# Patient Record
Sex: FEMALE | Race: WHITE | NOT HISPANIC OR LATINO | URBAN - METROPOLITAN AREA
[De-identification: names, ages, dates, MRNs, and addresses within clinical notes are randomized per-mention and may not be internally consistent; named-entity substitution may affect disease eponyms.]

---

## 2017-10-09 ENCOUNTER — HISTORICAL (OUTPATIENT)
Dept: ADMINISTRATIVE | Facility: HOSPITAL | Age: 32
End: 2017-10-09

## 2017-10-16 ENCOUNTER — HISTORICAL (OUTPATIENT)
Dept: OCCUPATIONAL THERAPY | Facility: HOSPITAL | Age: 32
End: 2017-10-16

## 2017-10-23 ENCOUNTER — HISTORICAL (OUTPATIENT)
Dept: OCCUPATIONAL THERAPY | Facility: HOSPITAL | Age: 32
End: 2017-10-23

## 2017-10-25 ENCOUNTER — HISTORICAL (OUTPATIENT)
Dept: OCCUPATIONAL THERAPY | Facility: HOSPITAL | Age: 32
End: 2017-10-25

## 2017-10-27 ENCOUNTER — HISTORICAL (OUTPATIENT)
Dept: OCCUPATIONAL THERAPY | Facility: HOSPITAL | Age: 32
End: 2017-10-27

## 2017-10-30 ENCOUNTER — HISTORICAL (OUTPATIENT)
Dept: OCCUPATIONAL THERAPY | Facility: HOSPITAL | Age: 32
End: 2017-10-30

## 2017-11-01 ENCOUNTER — HISTORICAL (OUTPATIENT)
Dept: OCCUPATIONAL THERAPY | Facility: HOSPITAL | Age: 32
End: 2017-11-01

## 2017-11-01 ENCOUNTER — HOSPITAL ENCOUNTER (OUTPATIENT)
Dept: MEDSURG UNIT | Facility: HOSPITAL | Age: 32
End: 2017-11-02
Attending: INTERNAL MEDICINE | Admitting: FAMILY MEDICINE

## 2017-11-01 LAB
ABS NEUT (OLG): 4.38 X10(3)/MCL (ref 2.1–9.2)
APTT PPP: 23.5 SECOND(S) (ref 23.3–37)
BASOPHILS # BLD AUTO: 0.03 X10(3)/MCL
BASOPHILS NFR BLD AUTO: 0 % (ref 0–1)
BUN SERPL-MCNC: 14 MG/DL (ref 7–18)
CALCIUM SERPL-MCNC: 8.8 MG/DL (ref 8.5–10.1)
CHLORIDE SERPL-SCNC: 105 MMOL/L (ref 98–107)
CO2 SERPL-SCNC: 26 MMOL/L (ref 21–32)
CREAT SERPL-MCNC: 0.9 MG/DL (ref 0.6–1.3)
EOSINOPHIL # BLD AUTO: 0.13 X10(3)/MCL
EOSINOPHIL NFR BLD AUTO: 2 % (ref 0–5)
ERYTHROCYTE [DISTWIDTH] IN BLOOD BY AUTOMATED COUNT: 12.8 % (ref 11.5–14.5)
GLUCOSE SERPL-MCNC: 98 MG/DL (ref 74–106)
HCT VFR BLD AUTO: 38.2 % (ref 35–46)
HGB BLD-MCNC: 12.6 GM/DL (ref 12–16)
IMM GRANULOCYTES # BLD AUTO: 0.01 10*3/UL
IMM GRANULOCYTES NFR BLD AUTO: 0 %
INR PPP: 0.9 (ref 0.9–1.2)
LYMPHOCYTES # BLD AUTO: 2.3 X10(3)/MCL
LYMPHOCYTES NFR BLD AUTO: 31 % (ref 15–40)
MCH RBC QN AUTO: 29.3 PG (ref 26–34)
MCHC RBC AUTO-ENTMCNC: 33 GM/DL (ref 31–37)
MCV RBC AUTO: 88.8 FL (ref 80–100)
MONOCYTES # BLD AUTO: 0.56 X10(3)/MCL
MONOCYTES NFR BLD AUTO: 8 % (ref 4–12)
NEUTROPHILS # BLD AUTO: 4.38 X10(3)/MCL
NEUTROPHILS NFR BLD AUTO: 59 X10(3)/MCL
PLATELET # BLD AUTO: 176 X10(3)/MCL (ref 130–400)
PMV BLD AUTO: 10.7 FL (ref 7.4–10.4)
POC BETA-HCG (QUAL): NEGATIVE
POTASSIUM SERPL-SCNC: 3.6 MMOL/L (ref 3.5–5.1)
PROTHROMBIN TIME: 12 SECOND(S) (ref 11.9–14.4)
RBC # BLD AUTO: 4.3 X10(6)/MCL (ref 4–5.2)
SODIUM SERPL-SCNC: 139 MMOL/L (ref 136–145)
WBC # SPEC AUTO: 7.4 X10(3)/MCL (ref 4.5–11)

## 2017-11-02 LAB
ABS NEUT (OLG): 2.35 X10(3)/MCL (ref 2.1–9.2)
ALBUMIN SERPL-MCNC: 3.1 GM/DL (ref 3.4–5)
ALBUMIN/GLOB SERPL: 1 RATIO (ref 1–2)
ALP SERPL-CCNC: 58 UNIT/L (ref 45–117)
ALT SERPL-CCNC: 14 UNIT/L (ref 12–78)
AST SERPL-CCNC: 12 UNIT/L (ref 15–37)
BASOPHILS # BLD AUTO: 0.04 X10(3)/MCL
BASOPHILS NFR BLD AUTO: 1 % (ref 0–1)
BILIRUB SERPL-MCNC: 0.3 MG/DL (ref 0.2–1)
BILIRUBIN DIRECT+TOT PNL SERPL-MCNC: 0.1 MG/DL
BILIRUBIN DIRECT+TOT PNL SERPL-MCNC: 0.2 MG/DL
BUN SERPL-MCNC: 13 MG/DL (ref 7–18)
CALCIUM SERPL-MCNC: 8.5 MG/DL (ref 8.5–10.1)
CHLORIDE SERPL-SCNC: 106 MMOL/L (ref 98–107)
CO2 SERPL-SCNC: 26 MMOL/L (ref 21–32)
CREAT SERPL-MCNC: 0.8 MG/DL (ref 0.6–1.3)
EOSINOPHIL # BLD AUTO: 0.21 10*3/UL
EOSINOPHIL NFR BLD AUTO: 4 % (ref 0–5)
ERYTHROCYTE [DISTWIDTH] IN BLOOD BY AUTOMATED COUNT: 12.9 % (ref 11.5–14.5)
GLOBULIN SER-MCNC: 3.5 GM/ML (ref 2.3–3.5)
GLUCOSE SERPL-MCNC: 87 MG/DL (ref 74–106)
HCT VFR BLD AUTO: 37.2 % (ref 35–46)
HGB BLD-MCNC: 12.2 GM/DL (ref 12–16)
IMM GRANULOCYTES # BLD AUTO: 0.02 10*3/UL
IMM GRANULOCYTES NFR BLD AUTO: 0 %
LYMPHOCYTES # BLD AUTO: 2.55 X10(3)/MCL
LYMPHOCYTES NFR BLD AUTO: 44 % (ref 15–40)
MCH RBC QN AUTO: 29.1 PG (ref 26–34)
MCHC RBC AUTO-ENTMCNC: 32.8 GM/DL (ref 31–37)
MCV RBC AUTO: 88.8 FL (ref 80–100)
MONOCYTES # BLD AUTO: 0.58 X10(3)/MCL
MONOCYTES NFR BLD AUTO: 10 % (ref 4–12)
NEUTROPHILS # BLD AUTO: 2.35 X10(3)/MCL
NEUTROPHILS NFR BLD AUTO: 41 X10(3)/MCL
PLATELET # BLD AUTO: 170 X10(3)/MCL (ref 130–400)
PMV BLD AUTO: 11.4 FL (ref 7.4–10.4)
POTASSIUM SERPL-SCNC: 4 MMOL/L (ref 3.5–5.1)
PROT SERPL-MCNC: 6.6 GM/DL (ref 6.4–8.2)
RBC # BLD AUTO: 4.19 X10(6)/MCL (ref 4–5.2)
SODIUM SERPL-SCNC: 141 MMOL/L (ref 136–145)
WBC # SPEC AUTO: 5.8 X10(3)/MCL (ref 4.5–11)

## 2017-11-07 ENCOUNTER — HISTORICAL (OUTPATIENT)
Dept: OCCUPATIONAL THERAPY | Facility: HOSPITAL | Age: 32
End: 2017-11-07

## 2017-11-08 ENCOUNTER — HISTORICAL (OUTPATIENT)
Dept: OCCUPATIONAL THERAPY | Facility: HOSPITAL | Age: 32
End: 2017-11-08

## 2017-11-10 ENCOUNTER — HISTORICAL (OUTPATIENT)
Dept: OCCUPATIONAL THERAPY | Facility: HOSPITAL | Age: 32
End: 2017-11-10

## 2017-11-13 ENCOUNTER — HISTORICAL (OUTPATIENT)
Dept: OCCUPATIONAL THERAPY | Facility: HOSPITAL | Age: 32
End: 2017-11-13

## 2017-11-15 ENCOUNTER — HISTORICAL (OUTPATIENT)
Dept: OCCUPATIONAL THERAPY | Facility: HOSPITAL | Age: 32
End: 2017-11-15

## 2017-11-17 ENCOUNTER — HISTORICAL (OUTPATIENT)
Dept: OCCUPATIONAL THERAPY | Facility: HOSPITAL | Age: 32
End: 2017-11-17

## 2017-12-05 ENCOUNTER — HISTORICAL (OUTPATIENT)
Dept: OCCUPATIONAL THERAPY | Facility: HOSPITAL | Age: 32
End: 2017-12-05

## 2017-12-07 ENCOUNTER — HISTORICAL (OUTPATIENT)
Dept: OCCUPATIONAL THERAPY | Facility: HOSPITAL | Age: 32
End: 2017-12-07

## 2017-12-19 ENCOUNTER — HISTORICAL (OUTPATIENT)
Dept: OCCUPATIONAL THERAPY | Facility: HOSPITAL | Age: 32
End: 2017-12-19

## 2017-12-26 ENCOUNTER — HISTORICAL (OUTPATIENT)
Dept: OCCUPATIONAL THERAPY | Facility: HOSPITAL | Age: 32
End: 2017-12-26

## 2017-12-28 ENCOUNTER — HISTORICAL (OUTPATIENT)
Dept: OCCUPATIONAL THERAPY | Facility: HOSPITAL | Age: 32
End: 2017-12-28

## 2018-01-02 ENCOUNTER — HISTORICAL (OUTPATIENT)
Dept: OCCUPATIONAL THERAPY | Facility: HOSPITAL | Age: 33
End: 2018-01-02

## 2018-01-09 ENCOUNTER — HISTORICAL (OUTPATIENT)
Dept: OCCUPATIONAL THERAPY | Facility: HOSPITAL | Age: 33
End: 2018-01-09

## 2018-01-11 ENCOUNTER — HISTORICAL (OUTPATIENT)
Dept: OCCUPATIONAL THERAPY | Facility: HOSPITAL | Age: 33
End: 2018-01-11

## 2018-01-15 ENCOUNTER — HISTORICAL (OUTPATIENT)
Dept: OCCUPATIONAL THERAPY | Facility: HOSPITAL | Age: 33
End: 2018-01-15

## 2018-01-19 ENCOUNTER — HISTORICAL (OUTPATIENT)
Dept: OCCUPATIONAL THERAPY | Facility: HOSPITAL | Age: 33
End: 2018-01-19

## 2018-01-24 ENCOUNTER — HISTORICAL (OUTPATIENT)
Dept: OCCUPATIONAL THERAPY | Facility: HOSPITAL | Age: 33
End: 2018-01-24

## 2018-02-28 ENCOUNTER — HISTORICAL (OUTPATIENT)
Dept: RADIOLOGY | Facility: HOSPITAL | Age: 33
End: 2018-02-28

## 2019-08-14 ENCOUNTER — HISTORICAL (OUTPATIENT)
Dept: OCCUPATIONAL THERAPY | Facility: HOSPITAL | Age: 34
End: 2019-08-14

## 2019-08-19 ENCOUNTER — HISTORICAL (OUTPATIENT)
Dept: OCCUPATIONAL THERAPY | Facility: HOSPITAL | Age: 34
End: 2019-08-19

## 2019-08-26 ENCOUNTER — HISTORICAL (OUTPATIENT)
Dept: OCCUPATIONAL THERAPY | Facility: HOSPITAL | Age: 34
End: 2019-08-26

## 2019-08-29 ENCOUNTER — HISTORICAL (OUTPATIENT)
Dept: OCCUPATIONAL THERAPY | Facility: HOSPITAL | Age: 34
End: 2019-08-29

## 2019-09-12 ENCOUNTER — HISTORICAL (OUTPATIENT)
Dept: OCCUPATIONAL THERAPY | Facility: HOSPITAL | Age: 34
End: 2019-09-12

## 2022-04-09 ENCOUNTER — HISTORICAL (OUTPATIENT)
Dept: ADMINISTRATIVE | Facility: HOSPITAL | Age: 37
End: 2022-04-09

## 2022-04-27 VITALS
OXYGEN SATURATION: 100 % | DIASTOLIC BLOOD PRESSURE: 79 MMHG | SYSTOLIC BLOOD PRESSURE: 122 MMHG | HEIGHT: 68 IN | WEIGHT: 260 LBS | BODY MASS INDEX: 39.4 KG/M2
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
## 2022-04-30 NOTE — ED PROVIDER NOTES
Patient:   Crystal Sparks            MRN: [id number]            FIN: [id number]               Age:   32 years     Sex:  Female     :  1985   Associated Diagnoses:   Acute deep vein thrombosis (DVT) of right lower extremity   Author:   Tracie Garcia      Basic Information   Time seen: Date & time 2017 10:26:00, Immediately upon arrival.   History source: Patient.   Arrival mode: Private vehicle.   History limitation: None.   Additional information: Chief Complaint from Nursing Triage Note : Chief Complaint   2017 10:20 CDT      Chief Complaint           torn right acl. was told to come to ed by ortho. swelling noted to right leg.  .      History of Present Illness   The patient presents with Right leg pain/swelling.  The onset was 1  days ago.  The course/duration of symptoms is constant.  Type of injury: none.  Location: Right knee leg. The character of symptoms is pain and swelling.  The degree at present is moderate.  There are exacerbating factors including movement and weight bearing.  The relieving factor is none.  Risk factors consist of none.  Prior episodes: none.  Therapy today: see nurses notes.  Associated symptoms: denies fever, denies chills, denies rash, denies edema, denies chest pain, denies shortness of breath and denies back pain.  Additional history:     Patient presents today c/o right calf pain and swelling. Calf pain pain started 2 weeks ago and swelling started 2 days ago.  Patient states she tore her right ACL 2 months ago and has had knee/leg pain with intermittent swelling since injury, but it has never been like this. She was doing PT before arrival to ED and was not able to complete session due to the pain. She was advised to come to ED for evaluation. Denies new injury/trauma, claudication, hx of blood clots, chest pain, palpitations, sob, recent travel, recent surgery..        Review of Systems   Constitutional symptoms:  No fever, no chills, no weakness.     Skin symptoms:  Negative except as documented in HPI.   Eye symptoms:  Vision unchanged.   ENMT symptoms:  Negative except as documented in HPI.   Respiratory symptoms:  No shortness of breath,    Cardiovascular symptoms:  Peripheral edema, No chest pain,    Gastrointestinal symptoms:  No abdominal pain, no nausea, no vomiting.    Genitourinary symptoms:  Negative except as documented in HPI.   Musculoskeletal symptoms:  Negative except as documented in HPI.   Neurologic symptoms:  Negative except as documented in HPI.             Additional review of systems information: All other systems reviewed and otherwise negative.      Health Status   Allergies:    Allergic Reactions (Selected)  No Known Allergies,    Allergies (1) Active Reaction  No Known Allergies None Documented  .   Medications:  (Selected)   Prescriptions  Prescribed  ibuprofen 800 mg oral tablet: 800 mg = 1 tab(s), Oral, q8hr, X 30 day(s), # 90 tab(s), 0 Refill(s), Pharmacy: [id number]  Documented Medications  Documented  CEPHALEXIN   CAP 500MG:   Diflucan 150 mg tablet: 150 mg = 1 tab(s), Oral, qWeek   mg oral tablet: 800 mg = 1 tab(s), Oral, TID, 0 Refill(s)  SMZ/TMP DS   -160:   Sprintec 0.25-35 mg-mcg tablet: 1 tab(s), Oral, Daily  lisinopril-hydrochlorothiazide 20-25 mg tablet: 1 tab(s), Oral, Daily, per nurse's notes.   Immunizations: Per nurse's notes.   Menstrual history: Per nurse's notes.      Past Medical/ Family/ Social History   Medical history:    No active or resolved past medical history items have been selected or recorded., Reviewed as documented in chart.   Surgical history:    tubal ligation.  .  ORIF r clavicle., Reviewed as documented in chart.   Family history:    No family history items have been selected or recorded., Reviewed as documented in chart.   Social history: Reviewed as documented in chart, Alcohol use: Denies, Tobacco use: Regularly, Drug use: Denies, Occupation: Unemployed,  Family/social situation: Intact family.      Physical Examination               Vital Signs             Time:  11/1/2017 10:27:00.   Vital Signs   11/1/2017 10:20 CDT      Temperature Oral          36.8 DegC                             Peripheral Pulse Rate     85 bpm                             Respiratory Rate          16 br/min                             SpO2                      100 %                             Oxygen Therapy            Room air                             Systolic Blood Pressure   138 mmHg                             Diastolic Blood Pressure  83 mmHg  .      Vital Signs (last 24 hrs)_____  Last Charted___________  Temp Oral     36.8 DegC  (NOV 01 10:20)  Heart Rate Peripheral   85 bpm  (NOV 01 10:20)  Resp Rate         16 br/min  (NOV 01 10:20)  SBP      138 mmHg  (NOV 01 10:20)  DBP      83 mmHg  (NOV 01 10:20)  SpO2      100 %  (NOV 01 10:20)  Weight      112.55 kg  (NOV 01 10:20)  Height      170 cm  (NOV 01 10:20)  BMI      38.94  (NOV 01 10:20)  .   Measurements   11/1/2017 10:20 CDT      Weight Dosing             112.55 kg                             Weight Measured           112.55 kg                             Weight Measured and Calculated in Lbs     248.13 lb                             Height/Length Dosing      170 cm                             Height/Length Measured    170 cm                             Body Mass Index Measured  38.94 kg/m2  .   Basic Oxygen Information   11/1/2017 10:20 CDT      SpO2                      100 %                             Oxygen Therapy            Room air  .   General:  Alert, no acute distress.    Skin:  Warm, dry, intact, normal for ethnicity.    Head:  Normocephalic, atraumatic.    Neck:  Supple, trachea midline, no JVD.    Eye:  Extraocular movements are intact, normal conjunctiva.    Ears, nose, mouth and throat:  Oral mucosa moist.   Cardiovascular:  Regular rate and rhythm, No murmur,   Right LE measures 47cm. Left LE measures  42cm.  Right calf tender and firm to palpation. No erythema, ecchymosis, or palpable cords. Negative Sami sign. , Arterial pulses: Bilateral, dorsalis pedis, 2+, Edema: Bilateral, pretibial, Right LE with 2+ pitting edema. Left LE with 1+ pitting edema., Capillary refill: Bilateral, lower extremity, < 2 seconds.    Respiratory:  Lungs are clear to auscultation, respirations are non-labored, breath sounds are equal, Symmetrical chest wall expansion.    Gastrointestinal:  Soft, Nontender, Non distended.    Back:  Normal range of motion, Normal alignment.    Musculoskeletal:  Normal ROM, normal strength, no tenderness, no swelling, no deformity.    Neurological:  Alert and oriented to person, place, time, and situation, No focal neurological deficit observed.       Medical Decision Making   Differential Diagnosis:  Deep vein thrombosis, arthritis, contusion.    Documents reviewed:  Emergency department nurses' notes, emergency department records, prior records.    Results review:  Lab results : Lab View   11/1/2017 10:44 CDT      Sodium Lvl                139 mmol/L                             Potassium Lvl             3.6 mmol/L                             Chloride                  105 mmol/L                             CO2                       26 mmol/L                             Calcium Lvl               8.8 mg/dL                             Glucose Lvl               98 mg/dL                             BUN                       14 mg/dL                             Creatinine                0.90 mg/dL                             eGFR-AA                   92 mL/min                             eGFR-PARUL                  76 mL/min  LOW                             PT                        12.0 second(s)                             INR                       0.90                             PTT                       23.5 second(s)                             WBC                       7.4 x10(3)/mcL                              RBC                       4.30 x10(6)/mcL                             Hgb                       12.6 gm/dL                             Hct                       38.2 %                             Platelet                  176 x10(3)/mcL                             MCV                       88.8 fL                             MCH                       29.3 pg                             MCHC                      33.0 gm/dL                             RDW                       12.8 %                             MPV                       10.7 fL  HI                             Abs Neut                  4.38 x10(3)/mcL                             Neutro Auto               59 x10(3)/mcL  NA                             Lymph Auto                31 %                             Mono Auto                 8 %                             Eos Auto                  2 %                             Abs Eos                   0.13 x10(3)/mcL  NA                             Basophil Auto             0 %                             Abs Neutro                4.38 x10(3)/mcL  NA                             Abs Lymph                 2.30 x10(3)/mcL  NA                             Abs Mono                  0.56 x10(3)/mcL  NA                             Abs Baso                  0.03 x10(3)/mcL  NA                             IG%                       0 %  NA                             IG#                       0.0100  NA    11/1/2017 10:38 CDT      U beta hCG Ql POC         Negative    ,    No qualifying data available.    Radiology results:  Rad Results (ST)  < 12 hrs   Accession: [id number]  Order: US Extremity Venous Right  Report Dt/Tm: 11/01/2017 11:32  Report:   History  Right leg pain     Reference Study  None available.     Findings  Sonographic images with color and spectral analysis were obtained of  the right lower extremity venous system. The right femoral, popliteal  and posterior tibial veins were noncompressible  compatible with  thrombosis, probably acute. Other imaged deep right lower extremity  veins demonstrate normal flow, compressibility, and augmentation  without evidence of thrombus. Superficial thrombus in the right lesser  saphenous vein.     Contralateral left common femoral vein demonstrated normal flow and  compressibility.     Impression  Exam positive for thrombus within the right femoral, popliteal and  posterior tibial veins.     Findings discussed with Dr. Angela at 1127 on 11/1/2017.        .      Reexamination/ Reevaluation   Time: 11/1/2017 11:39:00 .   Vital signs   Basic Oxygen Information   11/1/2017 10:20 CDT      SpO2                      100 %                             Oxygen Therapy            Room air     Course: well controlled.   Notes:       VSS, in NAD. Afebrile and non-toxic appearing. Resting comfortably in exam room with no complaints. Labs and US reviewed. Medicine will be consulted for management of DVT. .      Impression and Plan   Diagnosis   Acute deep vein thrombosis (DVT) of right lower extremity (VJE71-QB I82.401)      Calls-Consults   -  11/1/2017 11:38:00 , Lolis BOLES, [facility], Medicine, consult, recommends Will evaluated patient in ED. See consult note..    Plan   Condition: Stable.    Disposition: Admit time  11/1/2017 11:40:00, Admit to Inpatient Unit, Dispositioned by: Time: 11/1/2017 11:40:00, Tracie Garcia.    Counseled: Patient, Family, Regarding diagnosis, Regarding diagnostic results, Regarding treatment plan, Patient indicated understanding of instructions,  regarding prescription       Addendum   I performed a face to face evaluation of patient Crystal Sparks and my physical exam reveal/history reveals the following: Pt complaining of right LE pain-onset 2 weeks ago, pt has a history of torn ACL and was at physical therapy today where she couldn't complete the session due to exercuiating pain.  US reveals a + DVT in right lower extremity.  This case was initially  evaluated by Tracie Adorno under my supervision and I agree with her documentation, procedures and plan. I personally performed the Medical Decision Making for this patient

## 2022-07-17 ENCOUNTER — HOSPITAL ENCOUNTER (EMERGENCY)
Facility: HOSPITAL | Age: 37
Discharge: HOME | End: 2022-07-17
Attending: STUDENT IN AN ORGANIZED HEALTH CARE EDUCATION/TRAINING PROGRAM
Payer: MEDICAID

## 2022-07-17 VITALS
RESPIRATION RATE: 16 BRPM | OXYGEN SATURATION: 98 % | SYSTOLIC BLOOD PRESSURE: 145 MMHG | TEMPERATURE: 99 F | HEART RATE: 88 BPM | DIASTOLIC BLOOD PRESSURE: 88 MMHG | HEIGHT: 66 IN | WEIGHT: 280 LBS | BODY MASS INDEX: 45 KG/M2

## 2022-07-17 DIAGNOSIS — S20.212A RIB CONTUSION, LEFT, INITIAL ENCOUNTER: Primary | ICD-10-CM

## 2022-07-17 DIAGNOSIS — R52 PAIN: ICD-10-CM

## 2022-07-17 PROCEDURE — 99284 EMERGENCY DEPT VISIT MOD MDM: CPT | Mod: 25

## 2022-07-17 PROCEDURE — 96372 THER/PROPH/DIAG INJ SC/IM: CPT | Performed by: STUDENT IN AN ORGANIZED HEALTH CARE EDUCATION/TRAINING PROGRAM

## 2022-07-17 PROCEDURE — 63600175 PHARM REV CODE 636 W HCPCS: Performed by: STUDENT IN AN ORGANIZED HEALTH CARE EDUCATION/TRAINING PROGRAM

## 2022-07-17 RX ORDER — KETOROLAC TROMETHAMINE 30 MG/ML
30 INJECTION, SOLUTION INTRAMUSCULAR; INTRAVENOUS
Status: COMPLETED | OUTPATIENT
Start: 2022-07-17 | End: 2022-07-17

## 2022-07-17 RX ADMIN — KETOROLAC TROMETHAMINE 30 MG: 30 INJECTION, SOLUTION INTRAMUSCULAR at 05:07

## 2022-07-17 NOTE — ED PROVIDER NOTES
Encounter Date: 7/17/2022       History     Chief Complaint   Patient presents with    left side rib pain     Reports left sided rib pain x 1 [place] ago [place] the [place].       37yo WF presents to ED for left sided rib pain. States fell [place] the beach 1 [place] ago when she fell, states fell secondary to heat exhaustion. States thinks she may have broken a rib, states pain felt like it was improving now feels worse, hurts to take a deep breath. Took ibuprofen yesterday for pain but nothing today.         Review of patient's allergies indicates:  No Known Allergies  History reviewed. No pertinent past medical history.  Past Surgical History:   Procedure Laterality Date    BILATERAL TUBAL LIGATION       History reviewed. No pertinent family history.  Social History     Tobacco Use    Smoking status: Current Some Day Smoker    Smokeless tobacco: Never Used   Substance Use Topics    Alcohol use: Not Currently    Drug use: Never     Review of Systems   Constitutional: Negative for fever.   HENT: Negative for sore throat.    Respiratory: Negative for shortness of breath.    Cardiovascular: Negative for chest pain.   Gastrointestinal: Negative for abdominal pain, constipation, diarrhea, nausea and vomiting.   Genitourinary: Negative for dysuria.   Musculoskeletal: Negative for back pain.        Left side rib pain   Skin: Negative for rash.   Neurological: Negative for weakness.   Hematological: Does not bruise/bleed easily.       Physical Exam     Initial Vitals [07/17/22 1508]   BP Pulse Resp Temp SpO2   126/88 88 18 98.8 °F (37.1 °C) 98 %      MAP       --         Physical Exam    Nursing note and vitals reviewed.  Constitutional: She appears well-developed and well-nourished. She is not diaphoretic. No distress.   HENT:   Head: Normocephalic and atraumatic.   Right Ear: External ear normal.   Left Ear: External ear normal.   Nose: Nose normal.   Mouth/Throat: Oropharynx is clear and moist.   Eyes: Conjunctivae are  normal. Pupils are equal, round, and reactive to light.   Neck: Neck supple.   Normal range of motion.  Cardiovascular: Normal rate, regular rhythm, normal heart sounds and intact distal pulses.   Pulmonary/Chest: Breath sounds normal. No respiratory distress. She has no wheezes. She has no rhonchi. She exhibits tenderness.     Abdominal: Abdomen is soft. Bowel sounds are normal.   Musculoskeletal:      Cervical back: Normal range of motion and neck supple.     Neurological: She is alert and oriented to person, place, and time.   Skin: Skin is warm and dry. Capillary refill takes less than 2 seconds.   Psychiatric: She has a normal mood and affect. Her behavior is normal. Judgment and thought content normal.         ED Course   Procedures  Labs Reviewed - No data to display       Imaging Results          X-Ray Ribs 2 View Left (In process)  Result time 07/17/22 17:37:36              X-Rays:   Independently Interpreted Readings:   Other Readings:  Rib xray: no obvious fracture or dislocation, no acute cardiopulmonary abnormalities; pending official radiology read    Medications   ketorolac injection 30 mg (30 mg Intramuscular Given 7/17/22 1741)     Medical Decision Making:   Clinical Tests:   Radiological Study: Ordered and Reviewed  ED Management:  Toradol given in ED.   Discussed imaging findings.  Tylenol and ibuprofen prn.  Return precautions given.                      Clinical Impression:   Final diagnoses:  [R52] Pain  [S20.212A] Rib contusion, left, initial encounter (Primary)          ED Disposition Condition    Discharge Stable        ED Prescriptions     None        Follow-up Information     Follow up With Specialties Details Why Contact Info    [facility] Schedule an appointment as soon as possible for a visit in 1 week  [address] [address]  [phone]      [facility] Emergency Medicine  If symptoms worsen, As needed [address]  [facility] [address]  [phone]             Dorothy Cronin,   07/17/22 0796